# Patient Record
Sex: FEMALE | Race: WHITE | Employment: UNEMPLOYED | RURAL
[De-identification: names, ages, dates, MRNs, and addresses within clinical notes are randomized per-mention and may not be internally consistent; named-entity substitution may affect disease eponyms.]

---

## 2017-05-23 ENCOUNTER — HOSPITAL ENCOUNTER (OUTPATIENT)
Dept: GENERAL RADIOLOGY | Age: 72
Discharge: HOME | End: 2017-05-23

## 2017-05-23 DIAGNOSIS — Z12.31 ENCOUNTER FOR SCREENING MAMMOGRAM FOR BREAST CANCER: ICD-10-CM

## 2018-07-25 ENCOUNTER — APPOINTMENT (OUTPATIENT)
Dept: GENERAL RADIOLOGY | Facility: HOSPITAL | Age: 73
End: 2018-07-25
Payer: MEDICARE

## 2018-07-25 ENCOUNTER — HOSPITAL ENCOUNTER (EMERGENCY)
Facility: HOSPITAL | Age: 73
Discharge: HOME | End: 2018-07-25
Attending: FAMILY MEDICINE
Payer: MEDICARE

## 2018-07-25 VITALS
OXYGEN SATURATION: 98 % | HEART RATE: 82 BPM | RESPIRATION RATE: 16 BRPM | BODY MASS INDEX: 19.81 KG/M2 | DIASTOLIC BLOOD PRESSURE: 57 MMHG | TEMPERATURE: 98.5 F | HEIGHT: 64 IN | SYSTOLIC BLOOD PRESSURE: 107 MMHG | WEIGHT: 116 LBS

## 2018-07-25 DIAGNOSIS — R11.0 NAUSEA: Primary | ICD-10-CM

## 2018-07-25 DIAGNOSIS — N30.01 ACUTE CYSTITIS WITH HEMATURIA: ICD-10-CM

## 2018-07-25 LAB
A/G RATIO: 1.5 (ref 0.8–2)
ALBUMIN SERPL-MCNC: 4 G/DL (ref 3.4–4.8)
ALP BLD-CCNC: 63 U/L (ref 25–100)
ALT SERPL-CCNC: 41 U/L (ref 4–36)
ANION GAP SERPL CALCULATED.3IONS-SCNC: 12 MMOL/L (ref 3–16)
AST SERPL-CCNC: 68 U/L (ref 8–33)
BASOPHILS ABSOLUTE: 0 K/UL (ref 0–0.1)
BASOPHILS RELATIVE PERCENT: 1 %
BILIRUB SERPL-MCNC: 0.3 MG/DL (ref 0.3–1.2)
BILIRUBIN URINE: NEGATIVE
BLOOD, URINE: NEGATIVE
BUN BLDV-MCNC: 28 MG/DL (ref 6–20)
CALCIUM SERPL-MCNC: 9.1 MG/DL (ref 8.5–10.5)
CHLORIDE BLD-SCNC: 96 MMOL/L (ref 98–107)
CLARITY: CLEAR
CO2: 30 MMOL/L (ref 20–30)
COLOR: YELLOW
CREAT SERPL-MCNC: 1.2 MG/DL (ref 0.4–1.2)
EOSINOPHILS ABSOLUTE: 0 K/UL (ref 0–0.4)
EOSINOPHILS RELATIVE PERCENT: 0 %
GFR AFRICAN AMERICAN: 53
GFR NON-AFRICAN AMERICAN: 44
GLOBULIN: 2.6 G/DL
GLUCOSE BLD-MCNC: 112 MG/DL (ref 74–106)
GLUCOSE URINE: NEGATIVE MG/DL
HCT VFR BLD CALC: 36.9 % (ref 37–47)
HEMOGLOBIN: 11.9 G/DL (ref 11.5–16.5)
IMMATURE GRANULOCYTES #: 0 K/UL
IMMATURE GRANULOCYTES %: 1.3 % (ref 0–5)
KETONES, URINE: ABNORMAL MG/DL
LEUKOCYTE ESTERASE, URINE: ABNORMAL
LYMPHOCYTES ABSOLUTE: 0.3 K/UL (ref 1.5–4)
LYMPHOCYTES RELATIVE PERCENT: 9.7 %
MCH RBC QN AUTO: 29.1 PG (ref 27–32)
MCHC RBC AUTO-ENTMCNC: 32.2 G/DL (ref 31–35)
MCV RBC AUTO: 90.2 FL (ref 80–100)
MICROSCOPIC EXAMINATION: YES
MONOCYTES ABSOLUTE: 0.2 K/UL (ref 0.2–0.8)
MONOCYTES RELATIVE PERCENT: 6 %
NEUTROPHILS ABSOLUTE: 2.5 K/UL (ref 2–7.5)
NEUTROPHILS RELATIVE PERCENT: 82 %
NITRITE, URINE: NEGATIVE
PDW BLD-RTO: 12.9 % (ref 11–16)
PH UA: 6
PLATELET # BLD: 97 K/UL (ref 150–400)
PMV BLD AUTO: 11.9 FL (ref 6–10)
POTASSIUM SERPL-SCNC: 3 MMOL/L (ref 3.4–5.1)
PROTEIN UA: ABNORMAL MG/DL
RBC # BLD: 4.09 M/UL (ref 3.8–5.8)
RBC UA: NORMAL /HPF (ref 0–2)
SODIUM BLD-SCNC: 138 MMOL/L (ref 136–145)
SPECIFIC GRAVITY UA: 1.01
TOTAL PROTEIN: 6.6 G/DL (ref 6.4–8.3)
URINE REFLEX TO CULTURE: YES
URINE TYPE: ABNORMAL
UROBILINOGEN, URINE: 0.2 E.U./DL
WBC # BLD: 3 K/UL (ref 4–11)
WBC UA: NORMAL /HPF (ref 0–5)

## 2018-07-25 PROCEDURE — 80053 COMPREHEN METABOLIC PANEL: CPT

## 2018-07-25 PROCEDURE — 71045 X-RAY EXAM CHEST 1 VIEW: CPT

## 2018-07-25 PROCEDURE — 36415 COLL VENOUS BLD VENIPUNCTURE: CPT

## 2018-07-25 PROCEDURE — 6370000000 HC RX 637 (ALT 250 FOR IP): Performed by: FAMILY MEDICINE

## 2018-07-25 PROCEDURE — 96365 THER/PROPH/DIAG IV INF INIT: CPT

## 2018-07-25 PROCEDURE — 85025 COMPLETE CBC W/AUTO DIFF WBC: CPT

## 2018-07-25 PROCEDURE — 2580000003 HC RX 258: Performed by: FAMILY MEDICINE

## 2018-07-25 PROCEDURE — 87086 URINE CULTURE/COLONY COUNT: CPT

## 2018-07-25 PROCEDURE — 6360000002 HC RX W HCPCS: Performed by: FAMILY MEDICINE

## 2018-07-25 PROCEDURE — 81001 URINALYSIS AUTO W/SCOPE: CPT

## 2018-07-25 PROCEDURE — 99284 EMERGENCY DEPT VISIT MOD MDM: CPT

## 2018-07-25 RX ORDER — ATORVASTATIN CALCIUM 20 MG/1
20 TABLET, FILM COATED ORAL DAILY
COMMUNITY

## 2018-07-25 RX ORDER — LOSARTAN POTASSIUM 100 MG/1
100 TABLET ORAL DAILY
COMMUNITY

## 2018-07-25 RX ORDER — LEVOFLOXACIN 250 MG/1
250 TABLET ORAL DAILY
Qty: 5 TABLET | Refills: 0 | Status: SHIPPED | OUTPATIENT
Start: 2018-07-25 | End: 2018-07-30

## 2018-07-25 RX ORDER — POTASSIUM CHLORIDE 20 MEQ/1
20 TABLET, EXTENDED RELEASE ORAL DAILY
Qty: 3 TABLET | Refills: 0 | Status: SHIPPED | OUTPATIENT
Start: 2018-07-25 | End: 2018-07-28

## 2018-07-25 RX ORDER — SODIUM CHLORIDE 9 MG/ML
INJECTION, SOLUTION INTRAVENOUS CONTINUOUS
Status: DISCONTINUED | OUTPATIENT
Start: 2018-07-25 | End: 2018-07-25 | Stop reason: HOSPADM

## 2018-07-25 RX ORDER — POTASSIUM CHLORIDE 20 MEQ/1
20 TABLET, EXTENDED RELEASE ORAL ONCE
Status: COMPLETED | OUTPATIENT
Start: 2018-07-25 | End: 2018-07-25

## 2018-07-25 RX ORDER — LEVOFLOXACIN 5 MG/ML
500 INJECTION, SOLUTION INTRAVENOUS ONCE
Status: COMPLETED | OUTPATIENT
Start: 2018-07-25 | End: 2018-07-25

## 2018-07-25 RX ORDER — ONDANSETRON 4 MG/1
4 TABLET, ORALLY DISINTEGRATING ORAL EVERY 8 HOURS PRN
Qty: 6 TABLET | Refills: 0 | Status: SHIPPED | OUTPATIENT
Start: 2018-07-25

## 2018-07-25 RX ORDER — DICLOFENAC SODIUM 75 MG/1
75 TABLET, DELAYED RELEASE ORAL 2 TIMES DAILY
COMMUNITY

## 2018-07-25 RX ORDER — ALENDRONATE SODIUM 70 MG/1
70 TABLET ORAL
COMMUNITY

## 2018-07-25 RX ORDER — TRIAMTERENE AND HYDROCHLOROTHIAZIDE 37.5; 25 MG/1; MG/1
1 TABLET ORAL DAILY
COMMUNITY

## 2018-07-25 RX ORDER — DILTIAZEM HYDROCHLORIDE 180 MG/1
360 CAPSULE, EXTENDED RELEASE ORAL DAILY
COMMUNITY

## 2018-07-25 RX ADMIN — POTASSIUM CHLORIDE 20 MEQ: 20 TABLET, EXTENDED RELEASE ORAL at 13:07

## 2018-07-25 RX ADMIN — SODIUM CHLORIDE: 9 INJECTION, SOLUTION INTRAVENOUS at 12:16

## 2018-07-25 RX ADMIN — LEVOFLOXACIN 500 MG: 5 INJECTION, SOLUTION INTRAVENOUS at 13:07

## 2018-07-25 ASSESSMENT — ENCOUNTER SYMPTOMS
ABDOMINAL PAIN: 0
SHORTNESS OF BREATH: 0
VOMITING: 0
NAUSEA: 1
DIARRHEA: 0
COUGH: 0

## 2018-07-25 NOTE — ED PROVIDER NOTES
required my urgent intervention. CONSULTS:  None    PROCEDURES:  None    FINAL IMPRESSION      1. Nausea    2.  Acute cystitis with hematuria          DISPOSITION/PLAN   DISPOSITION Decision To Discharge 07/25/2018 12:59:07 PM      PATIENT REFERRED TO:  B[facility] Gutierrez - [facility]   [facility] [address]  [phone]    In 2 days  If symptoms worsen      DISCHARGE MEDICATIONS:  New Prescriptions    LEVOFLOXACIN (LEVAQUIN) 250 MG TABLET    Take 1 tablet by mouth daily for 5 days    ONDANSETRON (ZOFRAN ODT) 4 MG DISINTEGRATING TABLET    Take 1 tablet by mouth every 8 hours as needed for Nausea or Vomiting    POTASSIUM CHLORIDE (KLOR-CON M) 20 MEQ EXTENDED RELEASE TABLET    Take 1 tablet by mouth daily for 3 days       (Please note that portions of this note were completed with a voice recognition program.  Efforts were made to edit the dictations but occasionally words are mis-transcribed.)    Edwin Waite MD (electronically signed)  Attending Emergency Physician          Edwin Waite MD  07/25/18 6268

## 2018-07-26 ENCOUNTER — APPOINTMENT (OUTPATIENT)
Dept: CT IMAGING | Facility: HOSPITAL | Age: 73
End: 2018-07-26
Payer: MEDICARE

## 2018-07-26 ENCOUNTER — HOSPITAL ENCOUNTER (OUTPATIENT)
Facility: HOSPITAL | Age: 73
Setting detail: OBSERVATION
Discharge: ANOTHER HOSPITAL | End: 2018-07-28
Attending: FAMILY MEDICINE | Admitting: PEDIATRICS
Payer: MEDICARE

## 2018-07-26 DIAGNOSIS — I95.9 HYPOTENSION, UNSPECIFIED HYPOTENSION TYPE: ICD-10-CM

## 2018-07-26 DIAGNOSIS — E86.0 DEHYDRATION: ICD-10-CM

## 2018-07-26 DIAGNOSIS — R41.82 ALTERED MENTAL STATUS, UNSPECIFIED ALTERED MENTAL STATUS TYPE: Primary | ICD-10-CM

## 2018-07-26 LAB
ANION GAP SERPL CALCULATED.3IONS-SCNC: 16 MMOL/L (ref 3–16)
BASOPHILS ABSOLUTE: 0 K/UL (ref 0–0.1)
BASOPHILS RELATIVE PERCENT: 0.6 %
BUN BLDV-MCNC: 26 MG/DL (ref 6–20)
CALCIUM SERPL-MCNC: 8.2 MG/DL (ref 8.5–10.5)
CHLORIDE BLD-SCNC: 93 MMOL/L (ref 98–107)
CO2: 22 MMOL/L (ref 20–30)
CREAT SERPL-MCNC: 1 MG/DL (ref 0.4–1.2)
EOSINOPHILS ABSOLUTE: 0 K/UL (ref 0–0.4)
EOSINOPHILS RELATIVE PERCENT: 0 %
GFR AFRICAN AMERICAN: >59
GFR NON-AFRICAN AMERICAN: 54
GLUCOSE BLD-MCNC: 142 MG/DL (ref 74–106)
HCT VFR BLD CALC: 37.2 % (ref 37–47)
HEMOGLOBIN: 11.7 G/DL (ref 11.5–16.5)
IMMATURE GRANULOCYTES #: 0 K/UL
IMMATURE GRANULOCYTES %: 0.6 % (ref 0–5)
LYMPHOCYTES ABSOLUTE: 0.4 K/UL (ref 1.5–4)
LYMPHOCYTES RELATIVE PERCENT: 11.7 %
MCH RBC QN AUTO: 29.2 PG (ref 27–32)
MCHC RBC AUTO-ENTMCNC: 31.5 G/DL (ref 31–35)
MCV RBC AUTO: 92.8 FL (ref 80–100)
MONOCYTES ABSOLUTE: 0.2 K/UL (ref 0.2–0.8)
MONOCYTES RELATIVE PERCENT: 5.2 %
NEUTROPHILS ABSOLUTE: 2.7 K/UL (ref 2–7.5)
NEUTROPHILS RELATIVE PERCENT: 81.9 %
PDW BLD-RTO: 13.1 % (ref 11–16)
PLATELET # BLD: 42 K/UL (ref 150–400)
PMV BLD AUTO: 13.5 FL (ref 6–10)
POTASSIUM SERPL-SCNC: 3.6 MMOL/L (ref 3.4–5.1)
RBC # BLD: 4.01 M/UL (ref 3.8–5.8)
SODIUM BLD-SCNC: 131 MMOL/L (ref 136–145)
WBC # BLD: 3.3 K/UL (ref 4–11)

## 2018-07-26 PROCEDURE — 99285 EMERGENCY DEPT VISIT HI MDM: CPT

## 2018-07-26 PROCEDURE — 81001 URINALYSIS AUTO W/SCOPE: CPT

## 2018-07-26 PROCEDURE — 80048 BASIC METABOLIC PNL TOTAL CA: CPT

## 2018-07-26 PROCEDURE — 2580000003 HC RX 258: Performed by: FAMILY MEDICINE

## 2018-07-26 PROCEDURE — G0378 HOSPITAL OBSERVATION PER HR: HCPCS

## 2018-07-26 PROCEDURE — 36415 COLL VENOUS BLD VENIPUNCTURE: CPT

## 2018-07-26 PROCEDURE — 85025 COMPLETE CBC W/AUTO DIFF WBC: CPT

## 2018-07-26 PROCEDURE — 2580000003 HC RX 258: Performed by: PEDIATRICS

## 2018-07-26 PROCEDURE — 70450 CT HEAD/BRAIN W/O DYE: CPT

## 2018-07-26 PROCEDURE — 93005 ELECTROCARDIOGRAM TRACING: CPT

## 2018-07-26 RX ORDER — FAMOTIDINE 20 MG/1
10 TABLET, FILM COATED ORAL 2 TIMES DAILY
Status: DISCONTINUED | OUTPATIENT
Start: 2018-07-26 | End: 2018-07-28 | Stop reason: HOSPADM

## 2018-07-26 RX ORDER — FAMOTIDINE 20 MG/1
20 TABLET, FILM COATED ORAL 2 TIMES DAILY
Status: DISCONTINUED | OUTPATIENT
Start: 2018-07-26 | End: 2018-07-26 | Stop reason: DRUGHIGH

## 2018-07-26 RX ORDER — SODIUM CHLORIDE 0.9 % (FLUSH) 0.9 %
10 SYRINGE (ML) INJECTION PRN
Status: DISCONTINUED | OUTPATIENT
Start: 2018-07-26 | End: 2018-07-28 | Stop reason: HOSPADM

## 2018-07-26 RX ORDER — BISACODYL 10 MG
10 SUPPOSITORY, RECTAL RECTAL DAILY PRN
Status: DISCONTINUED | OUTPATIENT
Start: 2018-07-26 | End: 2018-07-28 | Stop reason: HOSPADM

## 2018-07-26 RX ORDER — SODIUM CHLORIDE 0.9 % (FLUSH) 0.9 %
10 SYRINGE (ML) INJECTION EVERY 12 HOURS SCHEDULED
Status: DISCONTINUED | OUTPATIENT
Start: 2018-07-26 | End: 2018-07-28 | Stop reason: HOSPADM

## 2018-07-26 RX ORDER — SODIUM CHLORIDE 9 MG/ML
INJECTION, SOLUTION INTRAVENOUS CONTINUOUS
Status: DISCONTINUED | OUTPATIENT
Start: 2018-07-26 | End: 2018-07-26 | Stop reason: SDUPTHER

## 2018-07-26 RX ORDER — SODIUM CHLORIDE 9 MG/ML
INJECTION, SOLUTION INTRAVENOUS CONTINUOUS
Status: DISCONTINUED | OUTPATIENT
Start: 2018-07-26 | End: 2018-07-28

## 2018-07-26 RX ADMIN — SODIUM CHLORIDE: 9 INJECTION, SOLUTION INTRAVENOUS at 23:10

## 2018-07-26 RX ADMIN — SODIUM CHLORIDE: 9 INJECTION, SOLUTION INTRAVENOUS at 21:41

## 2018-07-26 ASSESSMENT — ENCOUNTER SYMPTOMS
SHORTNESS OF BREATH: 0
ABDOMINAL PAIN: 0
VOMITING: 0
NAUSEA: 0

## 2018-07-26 NOTE — ED PROVIDER NOTES
(VOLTAREN) 75 MG EC TABLET    Take 75 mg by mouth 2 times daily    DILTIAZEM (DILACOR XR) 180 MG EXTENDED RELEASE CAPSULE    Take 360 mg by mouth daily    LEVOFLOXACIN (LEVAQUIN) 250 MG TABLET    Take 1 tablet by mouth daily for 5 days    LOSARTAN (COZAAR) 100 MG TABLET    Take 100 mg by mouth daily    METFORMIN (GLUCOPHAGE) 500 MG TABLET    Take 500 mg by mouth 2 times daily (with meals)    ONDANSETRON (ZOFRAN ODT) 4 MG DISINTEGRATING TABLET    Take 1 tablet by mouth every 8 hours as needed for Nausea or Vomiting    POTASSIUM CHLORIDE (KLOR-CON M) 20 MEQ EXTENDED RELEASE TABLET    Take 1 tablet by mouth daily for 3 days    TRIAMTERENE-HYDROCHLOROTHIAZIDE (MAXZIDE-25) 37.5-25 MG PER TABLET    Take 1 tablet by mouth daily       ALLERGIES     Patient has no known allergies. FAMILY HISTORY     History reviewed. No pertinent family history. SOCIAL HISTORY       Social History     Social History    Marital status:      Spouse name: N/A    Number of children: N/A    Years of education: N/A     Social History Main Topics    Smoking status: Never Smoker    Smokeless tobacco: Never Used    Alcohol use None    Drug use: Unknown    Sexual activity: Not Asked     Other Topics Concern    None     Social History Narrative    None       SCREENINGS             PHYSICAL EXAM    (up to 7 for level 4, 8 or more for level 5)     ED Triage Vitals [07/26/18 1724]   BP Temp Temp Source Pulse Resp SpO2 Height Weight   (!) 99/49 97.7 °F (36.5 °C) Oral 60 16 92 % 5' 4\" (1.626 m) 116 lb (52.6 kg)       Physical Exam   Constitutional: She is oriented to person, place, and time. She appears well-developed and well-nourished. No distress. HENT:   Mouth/Throat: Oropharynx is clear and moist.   Eyes: Conjunctivae are normal.   Neck: Neck supple. Cardiovascular: Normal rate, regular rhythm and normal heart sounds. Exam reveals no gallop. No murmur heard.   Pulmonary/Chest: Effort normal and breath sounds normal. No of this note were completed with a voice recognition program.  Efforts were made to edit the dictations but occasionally words are mis-transcribed.)    Macrina Krueger MD (electronically signed)  Attending Emergency Physician          Macrina Krueger MD  07/26/18 8727

## 2018-07-27 PROBLEM — E87.6 HYPOKALEMIA: Status: ACTIVE | Noted: 2018-07-27

## 2018-07-27 PROBLEM — D69.6 THROMBOCYTOPENIA (HCC): Status: ACTIVE | Noted: 2018-07-27

## 2018-07-27 PROBLEM — D61.818 PANCYTOPENIA (HCC): Status: ACTIVE | Noted: 2018-07-27

## 2018-07-27 PROBLEM — E53.8 B12 DEFICIENCY: Status: ACTIVE | Noted: 2018-07-27

## 2018-07-27 PROBLEM — R91.1 NODULE OF RIGHT LUNG: Status: ACTIVE | Noted: 2018-07-27

## 2018-07-27 PROBLEM — E83.42 HYPOMAGNESEMIA: Status: ACTIVE | Noted: 2018-07-27

## 2018-07-27 PROBLEM — E55.9 VITAMIN D DEFICIENCY: Status: ACTIVE | Noted: 2018-07-27

## 2018-07-27 PROBLEM — R53.1 GENERALIZED WEAKNESS: Status: ACTIVE | Noted: 2018-07-27

## 2018-07-27 LAB
ALBUMIN SERPL-MCNC: 3 G/DL (ref 3.4–4.8)
ALP BLD-CCNC: 53 U/L (ref 25–100)
ALT SERPL-CCNC: 34 U/L (ref 4–36)
AMMONIA: 66 MCG/DL (ref 19–87)
AMORPHOUS: ABNORMAL /HPF
ANION GAP SERPL CALCULATED.3IONS-SCNC: 12 MMOL/L (ref 3–16)
AST SERPL-CCNC: 77 U/L (ref 8–33)
BASOPHILS ABSOLUTE: 0 K/UL (ref 0–0.1)
BASOPHILS RELATIVE PERCENT: 0 %
BILIRUB SERPL-MCNC: 0.4 MG/DL (ref 0.3–1.2)
BILIRUBIN DIRECT: <0.2 MG/DL (ref 0–0.2)
BILIRUBIN URINE: NEGATIVE
BILIRUBIN, INDIRECT: ABNORMAL MG/DL (ref 0.2–0.8)
BLOOD, URINE: NEGATIVE
BUN BLDV-MCNC: 23 MG/DL (ref 6–20)
CALCIUM SERPL-MCNC: 7.6 MG/DL (ref 8.5–10.5)
CASTS 2: ABNORMAL /LPF
CHLORIDE BLD-SCNC: 98 MMOL/L (ref 98–107)
CLARITY: CLEAR
CO2: 24 MMOL/L (ref 20–30)
COLOR: YELLOW
CREAT SERPL-MCNC: 0.9 MG/DL (ref 0.4–1.2)
EOSINOPHILS ABSOLUTE: 0 K/UL (ref 0–0.4)
EOSINOPHILS RELATIVE PERCENT: 0 %
EPITHELIAL CELLS, UA: ABNORMAL /HPF
FOLATE: 16.93 NG/ML
GFR AFRICAN AMERICAN: >59
GFR NON-AFRICAN AMERICAN: >60
GLUCOSE BLD-MCNC: 120 MG/DL (ref 74–106)
GLUCOSE BLD-MCNC: 214 MG/DL (ref 74–106)
GLUCOSE BLD-MCNC: 90 MG/DL (ref 74–106)
GLUCOSE BLD-MCNC: 97 MG/DL (ref 74–106)
GLUCOSE URINE: NEGATIVE MG/DL
HCT VFR BLD CALC: 31.8 % (ref 37–47)
HEMOGLOBIN: 10.3 G/DL (ref 11.5–16.5)
IMMATURE GRANULOCYTES #: 0 K/UL
IMMATURE GRANULOCYTES %: 0.6 % (ref 0–5)
KETONES, URINE: NEGATIVE MG/DL
LEUKOCYTE ESTERASE, URINE: NEGATIVE
LYMPHOCYTES ABSOLUTE: 0.4 K/UL (ref 1.5–4)
LYMPHOCYTES RELATIVE PERCENT: 23.3 %
MAGNESIUM: 1.4 MG/DL (ref 1.7–2.4)
MCH RBC QN AUTO: 28.9 PG (ref 27–32)
MCHC RBC AUTO-ENTMCNC: 32.4 G/DL (ref 31–35)
MCV RBC AUTO: 89.1 FL (ref 80–100)
MICROSCOPIC EXAMINATION: YES
MONO TEST: NEGATIVE
MONOCYTES ABSOLUTE: 0.2 K/UL (ref 0.2–0.8)
MONOCYTES RELATIVE PERCENT: 8.5 %
NEUTROPHILS ABSOLUTE: 1.2 K/UL (ref 2–7.5)
NEUTROPHILS RELATIVE PERCENT: 67.6 %
NITRITE, URINE: NEGATIVE
PDW BLD-RTO: 13.1 % (ref 11–16)
PERFORMED ON: ABNORMAL
PERFORMED ON: NORMAL
PERFORMED ON: NORMAL
PH UA: 5.5
PLATELET # BLD: 30 K/UL (ref 150–400)
PMV BLD AUTO: 13 FL (ref 6–10)
POTASSIUM REFLEX MAGNESIUM: 3.3 MMOL/L (ref 3.4–5.1)
PROTEIN UA: 30 MG/DL
RAPID INFLUENZA  B AGN: NEGATIVE
RAPID INFLUENZA A AGN: NEGATIVE
RBC # BLD: 3.57 M/UL (ref 3.8–5.8)
RBC UA: ABNORMAL /HPF (ref 0–2)
SODIUM BLD-SCNC: 134 MMOL/L (ref 136–145)
SPECIFIC GRAVITY UA: 1.01
TOTAL CK: 88 U/L (ref 26–174)
TOTAL PROTEIN: 5.3 G/DL (ref 6.4–8.3)
TSH SERPL DL<=0.05 MIU/L-ACNC: 0.68 UIU/ML (ref 0.35–5.5)
URINE CULTURE, ROUTINE: NORMAL
URINE REFLEX TO CULTURE: ABNORMAL
URINE TYPE: ABNORMAL
UROBILINOGEN, URINE: 1 E.U./DL
VITAMIN B-12: 183 PG/ML (ref 211–911)
VITAMIN D 25-HYDROXY: 10.5 (ref 32–100)
WBC # BLD: 1.8 K/UL (ref 4–11)
WBC UA: ABNORMAL /HPF (ref 0–5)

## 2018-07-27 PROCEDURE — 2580000003 HC RX 258: Performed by: PHYSICIAN ASSISTANT

## 2018-07-27 PROCEDURE — G0378 HOSPITAL OBSERVATION PER HR: HCPCS

## 2018-07-27 PROCEDURE — 85025 COMPLETE CBC W/AUTO DIFF WBC: CPT

## 2018-07-27 PROCEDURE — 6360000002 HC RX W HCPCS: Performed by: PHYSICIAN ASSISTANT

## 2018-07-27 PROCEDURE — 82746 ASSAY OF FOLIC ACID SERUM: CPT

## 2018-07-27 PROCEDURE — G8978 MOBILITY CURRENT STATUS: HCPCS

## 2018-07-27 PROCEDURE — 87040 BLOOD CULTURE FOR BACTERIA: CPT

## 2018-07-27 PROCEDURE — 86308 HETEROPHILE ANTIBODY SCREEN: CPT

## 2018-07-27 PROCEDURE — 82140 ASSAY OF AMMONIA: CPT

## 2018-07-27 PROCEDURE — 6370000000 HC RX 637 (ALT 250 FOR IP): Performed by: PHYSICIAN ASSISTANT

## 2018-07-27 PROCEDURE — 82550 ASSAY OF CK (CPK): CPT

## 2018-07-27 PROCEDURE — 97161 PT EVAL LOW COMPLEX 20 MIN: CPT

## 2018-07-27 PROCEDURE — 82306 VITAMIN D 25 HYDROXY: CPT

## 2018-07-27 PROCEDURE — 87799 DETECT AGENT NOS DNA QUANT: CPT

## 2018-07-27 PROCEDURE — 96372 THER/PROPH/DIAG INJ SC/IM: CPT

## 2018-07-27 PROCEDURE — 6370000000 HC RX 637 (ALT 250 FOR IP): Performed by: PEDIATRICS

## 2018-07-27 PROCEDURE — G8979 MOBILITY GOAL STATUS: HCPCS

## 2018-07-27 PROCEDURE — 80076 HEPATIC FUNCTION PANEL: CPT

## 2018-07-27 PROCEDURE — 83735 ASSAY OF MAGNESIUM: CPT

## 2018-07-27 PROCEDURE — 96365 THER/PROPH/DIAG IV INF INIT: CPT

## 2018-07-27 PROCEDURE — 80048 BASIC METABOLIC PNL TOTAL CA: CPT

## 2018-07-27 PROCEDURE — 99219 PR INITIAL OBSERVATION CARE/DAY 50 MINUTES: CPT | Performed by: PEDIATRICS

## 2018-07-27 PROCEDURE — 86618 LYME DISEASE ANTIBODY: CPT

## 2018-07-27 PROCEDURE — 97802 MEDICAL NUTRITION INDIV IN: CPT

## 2018-07-27 PROCEDURE — 87804 INFLUENZA ASSAY W/OPTIC: CPT

## 2018-07-27 PROCEDURE — 84443 ASSAY THYROID STIM HORMONE: CPT

## 2018-07-27 PROCEDURE — 94760 N-INVAS EAR/PLS OXIMETRY 1: CPT

## 2018-07-27 PROCEDURE — 36415 COLL VENOUS BLD VENIPUNCTURE: CPT

## 2018-07-27 PROCEDURE — 96367 TX/PROPH/DG ADDL SEQ IV INF: CPT

## 2018-07-27 PROCEDURE — 96366 THER/PROPH/DIAG IV INF ADDON: CPT

## 2018-07-27 PROCEDURE — 82607 VITAMIN B-12: CPT

## 2018-07-27 RX ORDER — ERGOCALCIFEROL 1.25 MG/1
50000 CAPSULE ORAL WEEKLY
Status: DISCONTINUED | OUTPATIENT
Start: 2018-07-27 | End: 2018-07-28 | Stop reason: HOSPADM

## 2018-07-27 RX ORDER — 0.9 % SODIUM CHLORIDE 0.9 %
500 INTRAVENOUS SOLUTION INTRAVENOUS ONCE
Status: COMPLETED | OUTPATIENT
Start: 2018-07-27 | End: 2018-07-28

## 2018-07-27 RX ORDER — DOXYCYCLINE 100 MG/1
100 CAPSULE ORAL EVERY 12 HOURS SCHEDULED
Status: DISCONTINUED | OUTPATIENT
Start: 2018-07-27 | End: 2018-07-28 | Stop reason: HOSPADM

## 2018-07-27 RX ORDER — MAGNESIUM SULFATE IN WATER 40 MG/ML
2 INJECTION, SOLUTION INTRAVENOUS ONCE
Status: COMPLETED | OUTPATIENT
Start: 2018-07-27 | End: 2018-07-27

## 2018-07-27 RX ORDER — ACETAMINOPHEN 325 MG/1
650 TABLET ORAL EVERY 4 HOURS PRN
Status: DISCONTINUED | OUTPATIENT
Start: 2018-07-27 | End: 2018-07-28 | Stop reason: HOSPADM

## 2018-07-27 RX ORDER — CYANOCOBALAMIN 1000 UG/ML
1000 INJECTION INTRAMUSCULAR; SUBCUTANEOUS ONCE
Status: COMPLETED | OUTPATIENT
Start: 2018-07-27 | End: 2018-07-27

## 2018-07-27 RX ORDER — NICOTINE POLACRILEX 4 MG
15 LOZENGE BUCCAL PRN
Status: DISCONTINUED | OUTPATIENT
Start: 2018-07-27 | End: 2018-07-28 | Stop reason: HOSPADM

## 2018-07-27 RX ORDER — POTASSIUM CHLORIDE 20 MEQ/1
40 TABLET, EXTENDED RELEASE ORAL ONCE
Status: COMPLETED | OUTPATIENT
Start: 2018-07-27 | End: 2018-07-27

## 2018-07-27 RX ORDER — DEXTROSE MONOHYDRATE 50 MG/ML
100 INJECTION, SOLUTION INTRAVENOUS PRN
Status: DISCONTINUED | OUTPATIENT
Start: 2018-07-27 | End: 2018-07-28 | Stop reason: HOSPADM

## 2018-07-27 RX ORDER — DEXTROSE MONOHYDRATE 25 G/50ML
12.5 INJECTION, SOLUTION INTRAVENOUS PRN
Status: DISCONTINUED | OUTPATIENT
Start: 2018-07-27 | End: 2018-07-28 | Stop reason: HOSPADM

## 2018-07-27 RX ADMIN — FAMOTIDINE 10 MG: 20 TABLET ORAL at 21:20

## 2018-07-27 RX ADMIN — CYANOCOBALAMIN 1000 MCG: 1000 INJECTION, SOLUTION INTRAMUSCULAR at 11:47

## 2018-07-27 RX ADMIN — MAGNESIUM SULFATE HEPTAHYDRATE 2 G: 40 INJECTION, SOLUTION INTRAVENOUS at 10:01

## 2018-07-27 RX ADMIN — ERGOCALCIFEROL 50000 UNITS: 1.25 CAPSULE ORAL at 16:58

## 2018-07-27 RX ADMIN — DOXYCYCLINE 100 MG: 100 CAPSULE ORAL at 21:20

## 2018-07-27 RX ADMIN — FAMOTIDINE 10 MG: 20 TABLET ORAL at 08:27

## 2018-07-27 RX ADMIN — SODIUM CHLORIDE 500 ML: 9 INJECTION, SOLUTION INTRAVENOUS at 22:24

## 2018-07-27 RX ADMIN — ACETAMINOPHEN 650 MG: 325 TABLET, FILM COATED ORAL at 18:01

## 2018-07-27 RX ADMIN — INSULIN LISPRO 1 UNITS: 100 INJECTION, SOLUTION INTRAVENOUS; SUBCUTANEOUS at 21:20

## 2018-07-27 RX ADMIN — ACETAMINOPHEN 650 MG: 325 TABLET, FILM COATED ORAL at 06:54

## 2018-07-27 RX ADMIN — POTASSIUM CHLORIDE 40 MEQ: 20 TABLET, EXTENDED RELEASE ORAL at 10:01

## 2018-07-27 ASSESSMENT — PAIN SCALES - GENERAL: PAINLEVEL_OUTOF10: 0

## 2018-07-27 NOTE — H&P
History and Physical    Patient:  Mely Zurita    CHIEF COMPLAINT:    Altered mental status     HISTORY OF PRESENT ILLNESS:   The patient is a 67 y.o. female with PMH of DM II, HTN, HLD who presents due to altered mental status. History obtained from patient and chart review. No family at bedside this morning. Patient reports she had to come to the ED yesterday because her family thought she was confused. She doesn't remember much about yesterday. States she was taking antibiotic at home for a urinary tract infection and maybe the infection made her confused but she isn't sure. Per ED note she had nausea for 2 days as well as intermittent confusion worse than her baseline. At time of exam today she has no complaint. Denies cough, shortness of breath, chest pain, palpitations, sweats, chills, abdominal pain, n/v/d/c, neck pain/stiffness, sore throat, rash, wound. Ambulating without difficulty. Only admits to mild generalized weakness and fatigue. She is alert, oriented to self, place, year. Upon further questioning though confusion noted during conversation. She had low grade fever overnight 100.3. Past Medical History:      Diagnosis Date    Diabetes mellitus ([facility] [address]     Hyperlipidemia     Hypertension     Kidney stone        Past Surgical History:      Procedure Laterality Date    TUBAL LIGATION         Medications Prior to Admission:    Prior to Admission medications    Medication Sig Start Date End Date Taking?  Authorizing Provider   levofloxacin (LEVAQUIN) 250 MG tablet Take 1 tablet by mouth daily for 5 days 7/25/18 7/30/18 Yes Angelica Mitchell MD   ondansetron (ZOFRAN ODT) 4 MG disintegrating tablet Take 1 tablet by mouth every 8 hours as needed for Nausea or Vomiting 7/25/18  Yes Angelica Mitchell MD   potassium chloride (KLOR-CON M) 20 MEQ extended release tablet Take 1 tablet by mouth daily for 3 days 7/25/18 7/28/18 Yes Angelica Mitchell MD   diltiazem (DILACOR XR) 180 MG extended release capsule Take

## 2018-07-27 NOTE — ED NOTES
accepted pt for admission, per .      Srinivas [facility]  07/26/18 2124

## 2018-07-27 NOTE — PROGRESS NOTES
Physical Therapy    Facility/Department: [facility] MED SURG  Initial Assessment    NAME: Ambrosio Huggins  : 1945  MRN: [id number]    Date of Service: 2018    Discharge Recommendations:  Continue to assess pending progress, Home with Home health PT        Patient Diagnosis(es): The primary encounter diagnosis was Altered mental status, unspecified altered mental status type. Diagnoses of Hypotension, unspecified hypotension type and Dehydration were also pertinent to this visit. has a past medical history of Diabetes mellitus ([address] Hyperlipidemia; Hypertension; and Kidney stone. has a past surgical history that includes Tubal ligation. Restrictions  Restrictions/Precautions  Restrictions/Precautions: General Precautions, Fall Risk  Required Braces or Orthoses?: No  Vision/Hearing  Vision: Impaired  Vision Exceptions: Wears glasses for reading  Hearing: Within functional limits     Subjective  General  Chart Reviewed: Yes  Patient assessed for rehabilitation services?: Yes  Family / Caregiver Present: No  Referring Practitioner: Cady Cedillo PA-C  Referral Date : 18  Diagnosis: AMS  Follows Commands: Within Functional Limits  Subjective  Subjective: Patient in bed and agreeable to PT assessment.   Patient unsure of why she is at the hospital.    Pain Screening  Patient Currently in Pain: Denies  Vital Signs  Patient Currently in Pain: Denies       Orientation  Orientation  Overall Orientation Status: Within Functional Limits  Orientation Level: Oriented to time;Oriented to person;Oriented to place    Social/Functional History  Social/Functional History  Lives With: Spouse  Type of Home: House  Home Layout: Two level, Laundry in basement, Able to Live on Main level with bedroom/bathroom  Home Access: Level entry  Bathroom Shower/Tub: Tub/Shower unit  [place] Toilet: Handicap height  Bathroom Equipment: Grab bars in shower (Patient says the tub is made so you can sit on the sides )  Bathroom Accessibility: Accessible  Home Equipment: [employer] Help From: Family  ADL Assistance: Independent  Homemaking Assistance: Independent  Homemaking Responsibilities: Yes (Patient says she cooks and cleans, grocery shops, does laundry)  Ambulation Assistance: Independent  Transfer Assistance: Independent  Active : Yes  Leisure & Hobbies: do chores, garden  Additional Comments: unsure of accuracy of history - all per patient report     Objective          AROM RLE (degrees)  RLE AROM: WFL  AROM LLE (degrees)  LLE AROM : WFL  AROM RUE (degrees)  RUE AROM : WFL  AROM LUE (degrees)  LUE AROM : WFL  Strength RLE  Comment: grossly 4-/5  Strength LLE  Comment: grossly 4-/5        Bed mobility  Supine to Sit: Modified independent  Scooting: Modified independent     Transfers  Sit to Stand: Stand by assistance  Stand to sit: Stand by assistance     Ambulation  Ambulation?: Yes  Ambulation 1  Surface: level tile  Device: No Device;Hand-Held Assist  Assistance: Contact guard assistance  Quality of Gait: mild unsteady gait  Distance: 225 feet     Balance  Posture: Good  Sitting - Static: Good  Sitting - Dynamic: Good;-  Standing - Static: Fair;+  Standing - Dynamic: Fair        Assessment   Body structures, Functions, Activity limitations: Decreased functional mobility ; Decreased strength;Decreased balance  Assessment: Patient presents with mild unsteady gait. Patient requires only SBA for come to stand, and hand held assist for gait. Patient would benefit from skilled PT for strengthening, balance, gait, and conditioning activities.      Treatment Diagnosis: unsteady gait  Prognosis: Good  Decision Making: Low Complexity  REQUIRES PT FOLLOW UP: Yes  Activity Tolerance  Activity Tolerance: Patient Tolerated treatment well         Plan   Plan  Times per week: 4-5x/week  Times per day: Daily  Plan weeks: 1  Current Treatment Recommendations: Strengthening, Balance Training, Functional Mobility Training, Transfer

## 2018-07-27 NOTE — FLOWSHEET NOTE
07/27/18 0555   Vital Signs   Temp 100.1 °F (37.8 °C)   Temp Source Oral   Notified Dr. Sun Ott of above temp this AM, see new orders. Also notified DO of critical platelet result as well as other abnormal labs.

## 2018-07-27 NOTE — ED NOTES
3941 - Called med unit for bed and report. Patient will be going to room 14. RN unavailable for report, will call us back.      Nilesh Velarde RN  07/26/18 5240

## 2018-07-27 NOTE — ED PROVIDER NOTES
9:27 PM  Sabine Harrison was checked out to me by Dr. Reid Stoddard. Please see his/her initial documentation for details of the patient's ED presentation, physical exam and completed studies. In brief, Sabine Harrison is a 67 y.o. female that presents to ED secondary to having worsening mental status changes. Nausea for past 48 hrs. Fall in last night or two that was noninjurious. Pt has been getting forgetful and they are questioning about early dementia. No evidence of UTI, negative cxr for pneumonia, no chest pain, no shortness of breath, no abdominal pain. No elevated WBC, normal CR, elevated Bun. Noted to have some BP readings of 94/42. Pt on multiple BP meds.      I have reviewed and interpreted all of the currently available lab results from this visit (if applicable):  Results for orders placed or performed during the hospital encounter of 07/26/18   CBC Auto Differential   Result Value Ref Range    WBC 3.3 (L) 4.0 - 11.0 K/uL    RBC 4.01 3.80 - 5.80 M/uL    Hemoglobin 11.7 11.5 - 16.5 g/dL    Hematocrit 37.2 37.0 - 47.0 %    MCV 92.8 80.0 - 100.0 fL    MCH 29.2 27.0 - 32.0 pg    MCHC 31.5 31.0 - 35.0 g/dL    RDW 13.1 11.0 - 16.0 %    Platelets 42 (LL) 288 - 400 K/uL    MPV 13.5 (H) 6.0 - 10.0 fL    Neutrophils % 81.9 %    Immature Granulocytes % 0.6 0.0 - 5.0 %    Lymphocytes % 11.7 %    Monocytes % 5.2 %    Eosinophils % 0.0 %    Basophils % 0.6 %    Neutrophils # 2.7 2.0 - 7.5 K/uL    Immature Granulocytes # 0.0 K/uL    Lymphocytes # 0.4 (L) 1.5 - 4.0 K/uL    Monocytes # 0.2 0.2 - 0.8 K/uL    Eosinophils # 0.0 0.0 - 0.4 K/uL    Basophils # 0.0 0.0 - 0.1 K/uL   Basic Metabolic Panel   Result Value Ref Range    Sodium 131 (L) 136 - 145 mmol/L    Potassium 3.6 3.4 - 5.1 mmol/L    Chloride 93 (L) 98 - 107 mmol/L    CO2 22 20 - 30 mmol/L    Anion Gap 16 3 - 16    Glucose 142 (H) 74 - 106 mg/dL    BUN 26 (H) 6 - 20 mg/dL    CREATININE 1.0 0.4 - 1.2 mg/dL    GFR Non-African American 54 (L) >59    GFR  >59 >59    Calcium 8.2 (L) 8.5 - 10.5 mg/dL       MDM:  Called Dr. Yanna Olmedo at 9:26 pm who accepts as inpatient observation only, will give fluids for slow hydration, monitor mental status in which she was alert and oriented to person, place, time, date, month. No need for antibiotics without a source. Did check vitals and temp 100.5. Will hold all BP meds and see how her pressures. Final Impression:  1. Altered mental status, unspecified altered mental status type    2. Hypotension, unspecified hypotension type    3.  Dehydration        (Please note that portions of this note may have been completed with a voice recognition program. Efforts were made to edit the dictations but occasionally words are mis-transcribed.)    Marcus Sargent, [address], DO  07/26/18 1947

## 2018-07-27 NOTE — ED NOTES
Placed call to  for possible pt admission, call transferred to  at this time.      Fay [facility]  07/26/18 5181

## 2018-07-27 NOTE — CONSULTS
Nutrition Assessment    Type and Reason for Visit: Initial, Consult    Nutrition Recommendations: will start Ensure BID and encourage intakes. Malnutrition Assessment:  · Malnutrition Status: At risk for malnutrition  · Context: Acute illness or injury  · Findings of the 6 clinical characteristics of malnutrition (Minimum of 2 out of 6 clinical characteristics is required to make the diagnosis of moderate or severe Protein Calorie Malnutrition based on AND/ASPEN Guidelines):  1. Energy Intake-Not available, not able to assess    2. Weight Loss-Unable to assess, unable to assess  3. Fat Loss-No significant subcutaneous fat loss,    4. Muscle Loss-No significant muscle mass loss,    5. Fluid Accumulation-No significant fluid accumulation,    6.  Strength-Not measured    Nutrition Diagnosis:   · Problem:  (possible inadequate oral intake)  · Etiology: related to  (lack of appetite)     Signs and symptoms:  as evidenced by Patient report of, Weight loss (and poor intakes)    Nutrition Assessment:  · Subjective Assessment: Poor intakes, reported wt loss, patient could not tell RD how much wt she thought. Patient does have diabetes  · Nutrition-Focused Physical Findings: Patient has no skin issues, well developed, question if possible decreased cognition, didn't know if she lost wt recently, said she probably has in last couple of days. No past records to compare wt.   · Wound Type:    · Current Nutrition Therapies:  · Oral Diet Orders: General   · Oral Diet intake: Unable to assess  · Oral Nutrition Supplement (ONS) Orders: None  · ONS intake:    · Anthropometric Measures:  · Ht: 5' 4\" (162.6 cm)   · Current Body Wt: 117 lb (53.1 kg)  · Admission Body Wt:    · Usual Body Wt:    · % Weight Change:  ,     · Ideal Body Wt: 120 lb (54.4 kg), % Ideal Body 98  · Adjusted Body Wt:  , body weight adjusted for    · BMI Classification: BMI 18.5 - 24.9 Normal Weight  · Comparative Standards (Estimated Nutrition Needs):  · Estimated Daily Total Kcal: 9013-2801 (activity factor: 1.2-1.3)  · Estimated Daily Protein (g): 42-64    Estimated Intake vs Estimated Needs: Intake Less Than Needs    Nutrition Risk Level: Moderate, Low    Nutrition Interventions:   Start ONS  Continued Inpatient Monitoring, Coordination of Care    Nutrition Evaluation:   · Evaluation: Goals set   · Goals: maintain nutrition status and wt.  will start ONS and encourage PO intakes    · Monitoring: Meal Intake, Supplement Intake, Weight, Pertinent Labs    See Adult Nutrition Doc Flowsheet for more detail.      Electronically signed by Diane Castro on 7/27/18 at 3:18 PM

## 2018-07-27 NOTE — FLOWSHEET NOTE
07/26/18 2310   Assessment   Charting Type Admission   Neurological   Neuro (WDL) WDL   Level of Consciousness 0   Orientation Level Oriented X4   Cognition Short term memory loss;Poor attention/concentration;Poor safety awareness; Follows commands   Language Clear   R Hand  Moderate   L Hand  Moderate   R Foot Dorsiflexion Moderate   L Foot Dorsiflexion Moderate   R Foot Plantar Flexion Moderate   L Foot Plantar Flexion Moderate   NIH/MNIHSS Stroke Scale Assessed No   [place] Coma Scale   Eye Opening 4   Best Verbal Response 5   Best Motor Response 6   [place]afisa Coma Scale Score 15   HEENT   HEENT (WDL) WDL   Respiratory   Respiratory (WDL) WDL   Cardiac   Cardiac (WDL) WDL   Cardiac Monitor   Telemetry Monitor On Yes   Telemetry Audible Yes   Telemetry Alarms Set Yes   Telemetry Box Number 0500   Gastrointestinal   Abdominal (WDL) WDL   Peripheral Vascular   Peripheral Vascular (WDL) WDL   Edema None   Skin Color/Condition   Skin Color/Condition (WDL) WDL   Skin Integrity   Skin Integrity (WDL) WDL   Musculoskeletal   Musculoskeletal (WDL) WDL   Genitourinary   Genitourinary (WDL) WDL   Psychosocial   Psychosocial (WDL) WDL   Pt admitted to room 14 from ER at this time, family states pt has recently been to PCP and ER for same symptoms of confusion and weakness. Family also states they believe pt took double dose of BP meds this AM because she forgot she had taken them. On assessment pt A&Ox4, answering all questions approprietly. Bed alarm in place, call light within reach, will continue to monitor closely.

## 2018-07-28 ENCOUNTER — APPOINTMENT (OUTPATIENT)
Dept: GENERAL RADIOLOGY | Facility: HOSPITAL | Age: 73
End: 2018-07-28
Payer: MEDICARE

## 2018-07-28 ENCOUNTER — APPOINTMENT (OUTPATIENT)
Dept: CT IMAGING | Facility: HOSPITAL | Age: 73
End: 2018-07-28
Payer: MEDICARE

## 2018-07-28 VITALS
RESPIRATION RATE: 16 BRPM | DIASTOLIC BLOOD PRESSURE: 62 MMHG | WEIGHT: 123.38 LBS | SYSTOLIC BLOOD PRESSURE: 114 MMHG | HEIGHT: 64 IN | HEART RATE: 111 BPM | OXYGEN SATURATION: 86 % | BODY MASS INDEX: 21.06 KG/M2 | TEMPERATURE: 98.1 F

## 2018-07-28 PROBLEM — R50.9 FEVER: Status: ACTIVE | Noted: 2018-07-28

## 2018-07-28 LAB
A/G RATIO: 1 (ref 0.8–2)
ALBUMIN SERPL-MCNC: 2.4 G/DL (ref 3.4–4.8)
ALP BLD-CCNC: 65 U/L (ref 25–100)
ALT SERPL-CCNC: 39 U/L (ref 4–36)
ANION GAP SERPL CALCULATED.3IONS-SCNC: 11 MMOL/L (ref 3–16)
AST SERPL-CCNC: 100 U/L (ref 8–33)
BASOPHILS ABSOLUTE: 0 K/UL (ref 0–0.1)
BASOPHILS RELATIVE PERCENT: 0.6 %
BILIRUB SERPL-MCNC: 0.4 MG/DL (ref 0.3–1.2)
BUN BLDV-MCNC: 24 MG/DL (ref 6–20)
CALCIUM SERPL-MCNC: 6.9 MG/DL (ref 8.5–10.5)
CHLORIDE BLD-SCNC: 104 MMOL/L (ref 98–107)
CO2: 20 MMOL/L (ref 20–30)
CREAT SERPL-MCNC: 0.9 MG/DL (ref 0.4–1.2)
EOSINOPHILS ABSOLUTE: 0 K/UL (ref 0–0.4)
EOSINOPHILS RELATIVE PERCENT: 0 %
GFR AFRICAN AMERICAN: >59
GFR NON-AFRICAN AMERICAN: >60
GLOBULIN: 2.3 G/DL
GLUCOSE BLD-MCNC: 102 MG/DL (ref 74–106)
GLUCOSE BLD-MCNC: 116 MG/DL (ref 74–106)
GLUCOSE BLD-MCNC: 132 MG/DL (ref 74–106)
GLUCOSE BLD-MCNC: 132 MG/DL (ref 74–106)
GLUCOSE BLD-MCNC: 182 MG/DL (ref 74–106)
GLUCOSE BLD-MCNC: 79 MG/DL (ref 74–106)
HAV IGM SER IA-ACNC: NORMAL
HBA1C MFR BLD: 5.4 %
HCT VFR BLD CALC: 32.4 % (ref 37–47)
HEMOGLOBIN: 10.7 G/DL (ref 11.5–16.5)
HEPATITIS B CORE IGM ANTIBODY: NORMAL
HEPATITIS B SURFACE ANTIGEN INTERPRETATION: NORMAL
HEPATITIS C ANTIBODY INTERPRETATION: NORMAL
IMMATURE GRANULOCYTES #: 0 K/UL
IMMATURE GRANULOCYTES %: 1.1 % (ref 0–5)
INR BLD: 1.1 (ref 0.86–1.14)
LYMPHOCYTES ABSOLUTE: 0.5 K/UL (ref 1.5–4)
LYMPHOCYTES RELATIVE PERCENT: 26.4 %
MAGNESIUM: 1.7 MG/DL (ref 1.7–2.4)
MCH RBC QN AUTO: 29.7 PG (ref 27–32)
MCHC RBC AUTO-ENTMCNC: 33 G/DL (ref 31–35)
MCV RBC AUTO: 90 FL (ref 80–100)
MONOCYTES ABSOLUTE: 0.2 K/UL (ref 0.2–0.8)
MONOCYTES RELATIVE PERCENT: 10.1 %
NEUTROPHILS ABSOLUTE: 1.1 K/UL (ref 2–7.5)
NEUTROPHILS RELATIVE PERCENT: 61.8 %
PDW BLD-RTO: 13.5 % (ref 11–16)
PERFORMED ON: ABNORMAL
PERFORMED ON: NORMAL
PERFORMED ON: NORMAL
PLATELET # BLD: 21 K/UL (ref 150–400)
PMV BLD AUTO: 14.2 FL (ref 6–10)
POTASSIUM SERPL-SCNC: 3.7 MMOL/L (ref 3.4–5.1)
PROTHROMBIN TIME: 11.4 SEC (ref 9.5–10.9)
RBC # BLD: 3.6 M/UL (ref 3.8–5.8)
SODIUM BLD-SCNC: 135 MMOL/L (ref 136–145)
TOTAL PROTEIN: 4.7 G/DL (ref 6.4–8.3)
WBC # BLD: 1.8 K/UL (ref 4–11)

## 2018-07-28 PROCEDURE — 2580000003 HC RX 258: Performed by: PHYSICIAN ASSISTANT

## 2018-07-28 PROCEDURE — 71046 X-RAY EXAM CHEST 2 VIEWS: CPT

## 2018-07-28 PROCEDURE — G0378 HOSPITAL OBSERVATION PER HR: HCPCS

## 2018-07-28 PROCEDURE — 80074 ACUTE HEPATITIS PANEL: CPT

## 2018-07-28 PROCEDURE — 83036 HEMOGLOBIN GLYCOSYLATED A1C: CPT

## 2018-07-28 PROCEDURE — 80053 COMPREHEN METABOLIC PANEL: CPT

## 2018-07-28 PROCEDURE — 99217 PR OBSERVATION CARE DISCHARGE MANAGEMENT: CPT | Performed by: PHYSICIAN ASSISTANT

## 2018-07-28 PROCEDURE — 6360000002 HC RX W HCPCS: Performed by: PHYSICIAN ASSISTANT

## 2018-07-28 PROCEDURE — 96367 TX/PROPH/DG ADDL SEQ IV INF: CPT

## 2018-07-28 PROCEDURE — 74176 CT ABD & PELVIS W/O CONTRAST: CPT

## 2018-07-28 PROCEDURE — 36415 COLL VENOUS BLD VENIPUNCTURE: CPT

## 2018-07-28 PROCEDURE — 6370000000 HC RX 637 (ALT 250 FOR IP): Performed by: PEDIATRICS

## 2018-07-28 PROCEDURE — 94760 N-INVAS EAR/PLS OXIMETRY 1: CPT

## 2018-07-28 PROCEDURE — 85610 PROTHROMBIN TIME: CPT

## 2018-07-28 PROCEDURE — 85025 COMPLETE CBC W/AUTO DIFF WBC: CPT

## 2018-07-28 PROCEDURE — 2700000000 HC OXYGEN THERAPY PER DAY

## 2018-07-28 PROCEDURE — 6360000002 HC RX W HCPCS: Performed by: INTERNAL MEDICINE

## 2018-07-28 PROCEDURE — 83735 ASSAY OF MAGNESIUM: CPT

## 2018-07-28 PROCEDURE — 6370000000 HC RX 637 (ALT 250 FOR IP): Performed by: PHYSICIAN ASSISTANT

## 2018-07-28 RX ORDER — VANCOMYCIN HYDROCHLORIDE 1 G/200ML
1000 INJECTION, SOLUTION INTRAVENOUS ONCE
Status: COMPLETED | OUTPATIENT
Start: 2018-07-28 | End: 2018-07-28

## 2018-07-28 RX ORDER — 0.9 % SODIUM CHLORIDE 0.9 %
1000 INTRAVENOUS SOLUTION INTRAVENOUS ONCE
Status: COMPLETED | OUTPATIENT
Start: 2018-07-28 | End: 2018-07-28

## 2018-07-28 RX ORDER — ONDANSETRON 2 MG/ML
4 INJECTION INTRAMUSCULAR; INTRAVENOUS EVERY 6 HOURS PRN
Status: DISCONTINUED | OUTPATIENT
Start: 2018-07-28 | End: 2018-07-28 | Stop reason: HOSPADM

## 2018-07-28 RX ADMIN — VANCOMYCIN HYDROCHLORIDE 1000 MG: 1 INJECTION, SOLUTION INTRAVENOUS at 03:45

## 2018-07-28 RX ADMIN — SODIUM CHLORIDE: 9 INJECTION, SOLUTION INTRAVENOUS at 00:42

## 2018-07-28 RX ADMIN — MEROPENEM 1 G: 1 INJECTION, POWDER, FOR SOLUTION INTRAVENOUS at 05:26

## 2018-07-28 RX ADMIN — SODIUM CHLORIDE: 9 INJECTION, SOLUTION INTRAVENOUS at 05:26

## 2018-07-28 RX ADMIN — SODIUM CHLORIDE 1000 ML: 9 INJECTION, SOLUTION INTRAVENOUS at 07:36

## 2018-07-28 RX ADMIN — ACETAMINOPHEN 650 MG: 325 TABLET, FILM COATED ORAL at 02:36

## 2018-07-28 RX ADMIN — SODIUM CHLORIDE 1000 ML: 9 INJECTION, SOLUTION INTRAVENOUS at 03:34

## 2018-07-28 RX ADMIN — DOXYCYCLINE 100 MG: 100 CAPSULE ORAL at 08:40

## 2018-07-28 RX ADMIN — FAMOTIDINE 10 MG: 20 TABLET ORAL at 08:40

## 2018-07-28 RX ADMIN — ACETAMINOPHEN 650 MG: 325 TABLET, FILM COATED ORAL at 18:48

## 2018-07-28 ASSESSMENT — PAIN SCALES - GENERAL: PAINLEVEL_OUTOF10: 3

## 2018-07-28 NOTE — PROGRESS NOTES
RN attempted to call Zeinab Wills at this time. After multiple phone transfers RN was sent to [facility]. [facility] help desk was notified that pt is on her way.

## 2018-07-28 NOTE — PROGRESS NOTES
Received call back from Sadiq Turk at the access center with a number to call report for nurse at [facility] main tele floor.

## 2018-07-28 NOTE — PROGRESS NOTES
[address] [place] [address] for bed status, spoke to [employer] whom stated there was not a bed at this time. The bed coordinator will call us when one is available.

## 2018-07-28 NOTE — PROGRESS NOTES
Rx made aware of new orders for pharmacy to dose vancomycin and merrem. Reports that they will verify and dose at this time.

## 2018-07-28 NOTE — PROGRESS NOTES
Pt requested that RN call and update her family at this time. Call placed to daughter, Pebbles Syed with no answer. Second call placed to daughter, Erika Griffin. RN notified Erika Blountmelyssa of increasing fever and initiation of new Abx. Erika Griffin agreeable with plan of care. No other concerns at this time.

## 2018-07-28 NOTE — CONSULTS
Pharmacy to Dose Vancomycin    Dx: Altered mental status   Goal trough = > 15-20 mcg/mL  Pt wt = 53.1 kg  Estimated Creatinine Clearance: 47 mL/min (based on SCr of 0.9 mg/dL). Give Vancomycin 1000 mg IV x1 now. Followed by Vancomycin 750 mg IVPB q24h. Vancomycin trough prior to 4th dose (7/31 0300).   Julianna Pandya, Pharm D.7/28/2018 3:36 AM

## 2018-07-28 NOTE — DISCHARGE SUMMARY
Discharge Summary      Patient ID: Clemetine Sarah      Patient's PCP: BRI Herron CNP    Admit Date: 7/26/2018     Discharge Date:  7/28/2018    Admitting Provider: Armando Grimm DO    Discharging Provider: TEDDY Liu     Reason for this admission:   Altered mental status   Generalized weakness    Discharge Diagnoses: Active Hospital Problems    Diagnosis Date Noted    Fever [R50.9] 07/28/2018    Vitamin D deficiency [E55.9] 07/27/2018    B12 deficiency [E53.8] 07/27/2018    Hypomagnesemia [E83.42] 07/27/2018    Hypokalemia [E87.6] 07/27/2018    Pancytopenia ([address] [D61.818] 07/27/2018    Nodule of right lung [R91.1] 07/27/2018    Generalized weakness [R53.1] 07/27/2018    Altered mental status [R41.82] 07/26/2018       Procedures:  CT ABDOMEN PELVIS WO CONTRAST Additional Contrast? None   Final Result      1. No evidence for hepatosplenomegaly. Moderate size bilateral pleural effusions      Moderate pericardial effusion. Mild diffuse anasarca      Small to moderate volume of peritoneal fluid. Periportal fluid may relate to right heart failure or overhydration. XR CHEST STANDARD (2 VW)   Final Result      Development of suspected small left pleural effusion with associated atelectasis and/or pneumonitis. CT Head WO Contrast   Final Result      1. No acute intracranial process. Consults:   IP CONSULT TO DIETITIAN  PHARMACY TO DOSE VANCOMYCIN  PT/OT    Briefly:   Ms. Nicolasa Hinson is a pleasant 68 yo female with PMH of DM II, HTN, HLD, possible early dementia who presents due to altered mental status, generalized weakness, nausea. Patient was noted to be pancytopenic and have fever of unknown origin. Due to need for infectious disease consultation as well as possible neurology and hematology consultation patient required transfer for higher level of care. Hospital Course:       Active Hospital Problems    Diagnosis Date Noted    Fever [R50.9] 07/28/2018 n/v/d/c, neck pain, rash, wound, swelling, urinary symptoms. She is alert and oriented to self, place, time, situation today. Does admit to confusion overnight. Due to fever of unknown origin as well as pancytopenia case discussed with patient and 2 daughters and decision made to transfer patient to higher level of care for infectious disease consultation as well as possible hematology and neurology consult. Obtained CT abd/pelv this afternoon due to nausea and to check for hepatosplenomegaly which revealed signs of fluid overload (see full report above). No known history of heart failure. Due to patient fever and hypotension/ suspected sepsis unknown origin she did require IVF hydration during hospital course but now that vitals have stabilized will stop IVFs and monitor. She would likely benefit from echocardiogram as well due to findings consistent with possible heart failure and pericardial effusion on CT abd/pelv. Will defer to accepting team as echocardiogram not available at this facility. She has been accepted at [facility] by Dr. Vonda Adhikari for transfer to telemetry unit. Disposition: Transfer    Discharged Condition: Stable    Vitals:   Temp: 97.3 °F (36.3 °C)  Pulse: 96  Resp: 17  BP: (!) 102/59  SpO2: 93 % on RA    Vital signs reviewed in electronic chart    Physical exam  Constitutional:  Well developed, well nourished, no acute distress  Eyes:  PERRL, no scleral icterus, conjunctiva normal   HENT:  Atraumatic, external ears normal, nose normal, oropharynx moist, no pharyngeal exudates. Neck- supple, no JVD, no lymphadenopathy  Respiratory:  No respiratory distress on room air, no wheezing or rhonchi detected, few crackles appreciated bilateral bases   Cardiovascular:  Normal rate, normal rhythm, no murmurs, no gallops, no rubs, no edema   GI:  Soft, nondistended, normal bowel sounds, nontender, no voluntary guarding  Musculoskeletal:  No cyanosis or obvious acute deformity.  Moving all extremities   Integument:  Warm and dry. No rash or wound noted. Very small scab noted to left lower abdominal area where patient reports previous tick bite . Neurologic:  Alert & oriented to self, place, time, situation. Answers questions appropriately. No apparent focal deficits noted. Moves all extremities equally and ROM/strength intact. Speech normal and not dysarthric. Psychiatric:  Speech and behavior appropriate . Pleasant. Labs:  For convenience and continuity at follow-up the following most recent labs are provided:    CBC:   Lab Results   Component Value Date    WBC 1.8 07/28/2018    HGB 10.7 07/28/2018    HCT 32.4 07/28/2018    PLT 21 07/28/2018       RENAL:   Lab Results   Component Value Date     07/28/2018    K 3.7 07/28/2018    K 3.3 07/27/2018     07/28/2018    CO2 20 07/28/2018    BUN 24 07/28/2018    CREATININE 0.9 07/28/2018         Home Medications prior to admit:                Details   levofloxacin (LEVAQUIN) 250 MG tablet Take 1 tablet by mouth daily for 5 days  Qty: 5 tablet, Refills: 0      ondansetron (ZOFRAN ODT) 4 MG disintegrating tablet Take 1 tablet by mouth every 8 hours as needed for Nausea or Vomiting  Qty: 6 tablet, Refills: 0      potassium chloride (KLOR-CON M) 20 MEQ extended release tablet Take 1 tablet by mouth daily for 3 days  Qty: 3 tablet, Refills: 0      diltiazem (DILACOR XR) 180 MG extended release capsule Take 360 mg by mouth daily      losartan (COZAAR) 100 MG tablet Take 100 mg by mouth daily      alendronate (FOSAMAX) 70 MG tablet Take 70 mg by mouth every 7 days      atorvastatin (LIPITOR) 20 MG tablet Take 20 mg by mouth daily      diclofenac (VOLTAREN) 75 MG EC tablet Take 75 mg by mouth 2 times daily      metFORMIN (GLUCOPHAGE) 500 MG tablet Take 500 mg by mouth 2 times daily (with meals)      triamterene-hydrochlorothiazide (MAXZIDE-25) 37.5-25 MG per tablet Take 1 tablet by mouth daily            The case was discussed with Dr. Taryn Kennedy by phone and plan of care reviewed    Signed:  Electronically signed by TEDDY Liu on 7/28/2018 at 2:24 PM       Thank you BRI Herron CNP for the opportunity to be involved in this patient's care. If you have any questions or concerns please feel free to contact me at [phone].

## 2018-07-28 NOTE — PROGRESS NOTES
Called report to [facility] in [place]. Report given to [facility], RN. Pt to go to [facility] Rm#[id number]. Family/pt aware that we are awaiting EMS transfer.

## 2018-07-30 LAB — LYME, EIA: 0.22 LIV (ref 0–1.2)

## 2018-07-31 LAB
EBV, QUANT. COPY/ML: ABNORMAL CPY/ML
EBV, QUANT. INTERP: DETECTED
EBV, QUANT. LOG: ABNORMAL LOG
EBV, QUANT. SOURCE: ABNORMAL

## 2018-08-02 LAB
BLOOD CULTURE, ROUTINE: NORMAL
CULTURE, BLOOD 2: NORMAL